# Patient Record
Sex: MALE | Race: WHITE | NOT HISPANIC OR LATINO | Employment: PART TIME | URBAN - NONMETROPOLITAN AREA
[De-identification: names, ages, dates, MRNs, and addresses within clinical notes are randomized per-mention and may not be internally consistent; named-entity substitution may affect disease eponyms.]

---

## 2017-03-24 ENCOUNTER — HOSPITAL ENCOUNTER (EMERGENCY)
Facility: HOSPITAL | Age: 22
Discharge: HOME | End: 2017-03-25
Attending: FAMILY MEDICINE | Admitting: FAMILY MEDICINE

## 2017-03-24 VITALS
DIASTOLIC BLOOD PRESSURE: 69 MMHG | SYSTOLIC BLOOD PRESSURE: 138 MMHG | WEIGHT: 155 LBS | OXYGEN SATURATION: 96 % | HEIGHT: 74 IN | RESPIRATION RATE: 16 BRPM | BODY MASS INDEX: 19.89 KG/M2 | HEART RATE: 118 BPM | TEMPERATURE: 98.5 F

## 2017-03-24 DIAGNOSIS — S61.412A HAND LACERATION, LEFT, INITIAL ENCOUNTER: Primary | ICD-10-CM

## 2017-03-24 PROCEDURE — 99282 EMERGENCY DEPT VISIT SF MDM: CPT

## 2017-03-24 RX ORDER — LIDOCAINE HYDROCHLORIDE 10 MG/ML
10 INJECTION, SOLUTION INFILTRATION; PERINEURAL ONCE
Status: COMPLETED | OUTPATIENT
Start: 2017-03-24 | End: 2017-03-25

## 2017-03-25 PROCEDURE — 90471 IMMUNIZATION ADMIN: CPT | Performed by: FAMILY MEDICINE

## 2017-03-25 PROCEDURE — 90715 TDAP VACCINE 7 YRS/> IM: CPT | Performed by: FAMILY MEDICINE

## 2017-03-25 PROCEDURE — 86317 IMMUNOASSAY INFECTIOUS AGENT: CPT

## 2017-03-25 PROCEDURE — 25010000002 TDAP 5-2.5-18.5 LF-MCG/0.5 SUSPENSION: Performed by: FAMILY MEDICINE

## 2017-03-25 RX ORDER — IBUPROFEN 200 MG
1 TABLET ORAL ONCE
Status: COMPLETED | OUTPATIENT
Start: 2017-03-25 | End: 2017-03-25

## 2017-03-25 RX ADMIN — LIDOCAINE HYDROCHLORIDE 3 ML: 10 INJECTION, SOLUTION INFILTRATION; PERINEURAL at 00:12

## 2017-03-25 RX ADMIN — TETANUS TOXOID, REDUCED DIPHTHERIA TOXOID AND ACELLULAR PERTUSSIS VACCINE, ADSORBED 0.5 ML: 5; 2.5; 8; 8; 2.5 SUSPENSION INTRAMUSCULAR at 00:00

## 2017-03-25 RX ADMIN — POLYMYXIN B SULFATE, BACITRACIN ZINC, NEOMYCIN SULFATE 1 APPLICATION: 5000; 3.5; 4 OINTMENT TOPICAL at 01:47

## 2017-03-25 NOTE — ED PROVIDER NOTES
Subjective   HPI Comments: 21 yr old WM presenting to ED secondary to using a knife to cut something and slipped and knife went all the way through the outer side of thumb. Controlled bleeding, full movement of fingers devi thumb. Slight dysthesia around cut. Unsure of last tetanus. Pain 7/10, worse with moving thumb.       History provided by:  Patient      Review of Systems   Musculoskeletal: Positive for myalgias.   Skin: Positive for wound.   All other systems reviewed and are negative.      Past Medical History:   Diagnosis Date   • Fracture of wrist        Allergies   Allergen Reactions   • Peanuts [Peanut Oil]        Past Surgical History:   Procedure Laterality Date   • HAND SURGERY Left 10/04/2016    CR, pin, splint ring metacarpal oblique shaft fracture.   • TONSILLECTOMY         Family History   Problem Relation Age of Onset   • Hypertension Other        Social History     Social History   • Marital status: Single     Spouse name: N/A   • Number of children: N/A   • Years of education: N/A     Social History Main Topics   • Smoking status: Never Smoker   • Smokeless tobacco: None   • Alcohol use No   • Drug use: No   • Sexual activity: Defer     Other Topics Concern   • None     Social History Narrative           Objective   Physical Exam   Constitutional: He is oriented to person, place, and time. He appears well-developed and well-nourished. No distress.   HENT:   Head: Normocephalic and atraumatic.   Eyes: EOM are normal. Pupils are equal, round, and reactive to light.   Neck: Neck supple.   Cardiovascular: Regular rhythm and normal heart sounds.    Pulmonary/Chest: Effort normal and breath sounds normal.   Musculoskeletal: Normal range of motion. He exhibits tenderness.        Hands:  Neurological: He is alert and oriented to person, place, and time.   Skin: Skin is warm and dry.   Psychiatric: He has a normal mood and affect. His behavior is normal.   Nursing note and vitals  reviewed.      Procedures         ED Course  ED Course      Procedure:  With first laceration 3 cm length, area covered with sterile dressing, hibiclens used to clean wound, using 1% Lidocaine to anesthetize area, wound fully explored without evidence of tendon laceration or muscle laceration. Closed with 5 4-0 Ethilon interrupted sutures.  With second Laceration 1.6 cm, same as above with site sterility, cleansing, exploring wound. Used 4 4-0 interrupted Ethilon sutures to close wound.  No complications; Tolerated well            MDM    Final diagnoses:   Hand laceration, left, initial encounter            David Dodson DO  03/25/17 0107

## 2018-01-14 ENCOUNTER — HOSPITAL ENCOUNTER (EMERGENCY)
Facility: HOSPITAL | Age: 23
Discharge: HOME | End: 2018-01-14
Attending: EMERGENCY MEDICINE | Admitting: EMERGENCY MEDICINE

## 2018-01-14 VITALS
DIASTOLIC BLOOD PRESSURE: 79 MMHG | HEIGHT: 74 IN | SYSTOLIC BLOOD PRESSURE: 150 MMHG | OXYGEN SATURATION: 99 % | WEIGHT: 162 LBS | TEMPERATURE: 98.6 F | RESPIRATION RATE: 18 BRPM | HEART RATE: 85 BPM | BODY MASS INDEX: 20.79 KG/M2

## 2018-01-14 DIAGNOSIS — I73.00 RAYNAUD'S PHENOMENON WITHOUT GANGRENE: Primary | ICD-10-CM

## 2018-01-14 PROCEDURE — 99282 EMERGENCY DEPT VISIT SF MDM: CPT

## 2018-01-14 NOTE — DISCHARGE INSTRUCTIONS
Raynaud Phenomenon  Raynaud phenomenon is a condition that affects the blood vessels (arteries) that carry blood to your fingers and toes. The arteries that supply blood to your ears or the tip of your nose might also be affected. Raynaud phenomenon causes the arteries to temporarily narrow. As a result, the flow of blood to the affected areas is temporarily decreased. This usually occurs in response to cold temperatures or stress. During an attack, the skin in the affected areas turns white. You may also feel tingling or numbness in those areas.  Attacks usually last for only a brief period, and then the blood flow to the area returns to normal. In most cases, Raynaud phenomenon does not cause serious health problems.  CAUSES   For many people with this condition, the cause is not known. Raynaud phenomenon is sometimes associated with other diseases, such as scleroderma or lupus.  RISK FACTORS  Raynaud phenomenon can affect anyone, but it develops most often in people who are 20-40 years old. It affects more females than males.  SIGNS AND SYMPTOMS  Symptoms of Raynaud phenomenon may occur when you are exposed to cold temperatures or when you have emotional stress. The symptoms may last for a few minutes or up to several hours. They usually affect your fingers but may also affect your toes, ears, or the tip of your nose. Symptoms may include:  · Changes in skin color. The skin in the affected areas will turn pale or white. The skin may then change from white to bluish to red as normal blood flow returns to the area.  · Numbness, tingling, or pain in the affected areas.  In severe cases, sores may develop in the affected areas.   DIAGNOSIS   Your health care provider will do a physical exam and take your medical history. You may be asked to put your hands in cold water to check for a reaction to cold temperature. Blood tests may be done to check for other diseases or conditions. Your health care provider may also  order a test to check the movement of blood through your arteries and veins (vascular ultrasound).  TREATMENT   Treatment often involves making lifestyle changes and taking steps to control your exposure to cold temperatures. For more severe cases, medicine (calcium channel blockers) may be used to improve blood flow. Surgery is sometimes done to block the nerves that control the affected arteries, but this is rare.  HOME CARE INSTRUCTIONS   · Avoid exposure to cold by taking these steps:  ¨ If possible, stay indoors during cold weather.  ¨ When you go outside during cold weather, dress in layers and wear mittens, a hat, a scarf, and warm footwear.  ¨ Wear mittens or gloves when handling ice or frozen food.  ¨ Use holders for glasses or cans containing cold drinks.  ¨ Let warm water run for a while before taking a shower or bath.  ¨ Warm up the car before driving in cold weather.  · If possible, avoid stressful and emotional situations. Exercise, meditation, and yoga may help you cope with stress. Biofeedback may be useful.  · Do not use any tobacco products, including cigarettes, chewing tobacco, or electronic cigarettes. If you need help quitting, ask your health care provider.  · Avoid secondhand smoke.  · Limit your use of caffeine. Switch to decaffeinated coffee, tea, and soda. Avoid chocolate.  · Wear loose fitting socks and comfortable, roomy shoes.  · Avoid vibrating tools and machinery.  · Take medicines only as directed by your health care provider.  SEEK MEDICAL CARE IF:   · Your discomfort becomes worse despite lifestyle changes.  · You develop sores on your fingers or toes that do not heal.  · Your fingers or toes turn black.  · You have breaks in the skin on your fingers or toes.  · You have a fever.  · You have pain or swelling in your joints.  · You have a rash.  · Your symptoms occur on only one side of your body.  This information is not intended to replace advice given to you by your health care  provider. Make sure you discuss any questions you have with your health care provider.  Document Released: 12/15/2001 Document Revised: 01/08/2016 Document Reviewed: 07/23/2015  Elsevier Interactive Patient Education © 2017 [employer]

## 2018-01-14 NOTE — ED PROVIDER NOTES
Subjective   History of Present Illness  22-year-old male otherwise healthy presenting with numbness to the left ring finger.  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  Initially, the finger was white and numb and since has turned purple.  He denies any trauma to the finger.  He does have an old boxers fracture in the area of this hand that that was over a year ago.  Denies any similar cyst symptoms in the past.  Does not smoke.  No history of IV drug abuse.    Review of Systems   Skin: Positive for color change and pallor.   All other systems reviewed and are negative.      Past Medical History:   Diagnosis Date   • Fracture of wrist        Allergies   Allergen Reactions   • Peanuts [Peanut Oil]        Past Surgical History:   Procedure Laterality Date   • HAND SURGERY Left 10/04/2016    CR, pin, splint ring metacarpal oblique shaft fracture.   • TONSILLECTOMY         Family History   Problem Relation Age of Onset   • Hypertension Other        Social History     Social History   • Marital status: Single     Spouse name: N/A   • Number of children: N/A   • Years of education: N/A     Social History Main Topics   • Smoking status: Never Smoker   • Smokeless tobacco: None   • Alcohol use No   • Drug use: No   • Sexual activity: Defer     Other Topics Concern   • None     Social History Narrative           Objective   Physical Exam   Constitutional: He is oriented to person, place, and time. He appears well-developed and well-nourished. No distress.   HENT:   Head: Normocephalic.   Mouth/Throat: Oropharynx is clear and moist.   Cardiovascular: Normal rate, regular rhythm, normal heart sounds and intact distal pulses.  Exam reveals no gallop and no friction rub.    No murmur heard.  Pulmonary/Chest: Effort normal and breath sounds normal. No respiratory distress. He has no wheezes. He has no rales.   Musculoskeletal: Normal range of motion. He exhibits no deformity.   Neurological: He is  alert and oriented to person, place, and time.   Able to make thumbs up, a-ok sign, cross fingers without difficulty. Able to flex and extend across all joints and sensation intact to all aspects of hand. 2+ radial pulses bilaterally   Skin: Skin is dry. No rash noted. He is not diaphoretic. No erythema. There is pallor.   L ring finger w/ purple nailbed with CRF about 3sec. Slightly numb to light touch.    Psychiatric: He has a normal mood and affect. His behavior is normal.   Nursing note and vitals reviewed.      Procedures         ED Course  ED Course                  MDM  22M here w/ numb, purple finger with slightly delayed capillary refill. AFVSS. Concern for distal thrombosis or embolus though unlikely given lack of risk factors vs Raynoud's phenomenon though odd to only be present in one finger.     4:32 PM Spoke to Dr. Baker who felt it unlikely that finger had distal embolus / thrombus and asked to warm hand and reassess. Now, hand warm, normal color, no numbness and patient feels back to baseline. Given this, will d/c home with strict return to care precautions.    Final diagnoses:   Raynaud's phenomenon without gangrene            Agustin Louise MD  01/14/18 7789

## 2018-04-29 ENCOUNTER — HOSPITAL ENCOUNTER (EMERGENCY)
Facility: HOSPITAL | Age: 23
Discharge: HOME | End: 2018-04-29
Attending: EMERGENCY MEDICINE | Admitting: EMERGENCY MEDICINE

## 2018-04-29 ENCOUNTER — APPOINTMENT (OUTPATIENT)
Dept: GENERAL RADIOLOGY | Facility: HOSPITAL | Age: 23
End: 2018-04-29

## 2018-04-29 VITALS
BODY MASS INDEX: 22.07 KG/M2 | HEART RATE: 82 BPM | OXYGEN SATURATION: 98 % | TEMPERATURE: 98.3 F | DIASTOLIC BLOOD PRESSURE: 80 MMHG | WEIGHT: 172 LBS | HEIGHT: 74 IN | SYSTOLIC BLOOD PRESSURE: 133 MMHG | RESPIRATION RATE: 16 BRPM

## 2018-04-29 DIAGNOSIS — S62.307A CLOSED FRACTURE OF FIFTH METACARPAL BONE OF LEFT HAND, UNSPECIFIED FRACTURE MORPHOLOGY, INITIAL ENCOUNTER: Primary | ICD-10-CM

## 2018-04-29 PROCEDURE — 99283 EMERGENCY DEPT VISIT LOW MDM: CPT

## 2018-04-29 PROCEDURE — 73130 X-RAY EXAM OF HAND: CPT

## 2018-04-29 RX ORDER — IBUPROFEN 800 MG/1
800 TABLET ORAL EVERY 8 HOURS PRN
Qty: 90 TABLET | Refills: 0 | Status: SHIPPED | OUTPATIENT
Start: 2018-04-29

## 2018-05-01 ENCOUNTER — OFFICE VISIT (OUTPATIENT)
Dept: ORTHOPEDIC SURGERY | Facility: CLINIC | Age: 23
End: 2018-05-01

## 2018-05-01 VITALS — BODY MASS INDEX: 22.07 KG/M2 | WEIGHT: 172 LBS | HEIGHT: 74 IN | RESPIRATION RATE: 18 BRPM

## 2018-05-01 DIAGNOSIS — S62.337A CLOSED DISPLACED FRACTURE OF NECK OF FIFTH METACARPAL BONE OF LEFT HAND, INITIAL ENCOUNTER: Primary | ICD-10-CM

## 2018-05-01 PROCEDURE — 99213 OFFICE O/P EST LOW 20 MIN: CPT | Performed by: ORTHOPAEDIC SURGERY

## 2018-05-01 NOTE — PROGRESS NOTES
Subjective   Patient ID: Jose Morse is a 22 y.o. male  Pain of the Left Hand (Patient states he punched a wall late Friday night early Saturday morning 04/27-28/18. He says it just feels like a broken hand, the pain is not awful but it's not fun.)             History of Present Illness    Right-hand dominant male injured his left hand when he hit a wall, has a history of the left ring  finger fracture playing football 2 years ago treated with pinning, denies any new swelling pain at the base of the ring metacarpal.  He was placed in splint x-rays done elsewhere showed minimal displaced boxer's fracture distal small metacarpal neck of the right hand    Review of Systems   Constitutional: Negative for fever.   HENT: Negative for voice change.    Eyes: Negative for visual disturbance.   Respiratory: Negative for shortness of breath.    Cardiovascular: Negative for chest pain.   Gastrointestinal: Negative for abdominal distention and abdominal pain.   Genitourinary: Negative for dysuria.   Musculoskeletal: Positive for arthralgias. Negative for gait problem and joint swelling.   Skin: Negative for rash.   Neurological: Negative for speech difficulty.   Hematological: Does not bruise/bleed easily.   Psychiatric/Behavioral: Negative for confusion.       Past Medical History:   Diagnosis Date   • Fracture of wrist         Past Surgical History:   Procedure Laterality Date   • HAND SURGERY Left 10/04/2016    CR, pin, splint ring metacarpal oblique shaft fracture.   • TONSILLECTOMY         Family History   Problem Relation Age of Onset   • Hypertension Other        Social History     Social History   • Marital status: Single     Spouse name: N/A   • Number of children: N/A   • Years of education: N/A     Occupational History   • Not on file.     Social History Main Topics   • Smoking status: Never Smoker   • Smokeless tobacco: Never Used   • Alcohol use No   • Drug use: No   • Sexual activity: Defer     Other  "Topics Concern   • Not on file     Social History Narrative   • No narrative on file       I have reviewed all of the above social hx, family hx, surgical hx, medications, allergies & ROS and confirm that it is accurate.    Allergies   Allergen Reactions   • Peanuts [Peanut Oil]          Current Outpatient Prescriptions:   •  cephalexin (KEFLEX) 500 MG capsule, Take 1 capsule by mouth 3 (Three) Times a Day., Disp: 30 capsule, Rfl: 0  •  cetirizine (zyrTEC) 10 MG tablet, Take 1 tablet by mouth Daily., Disp: 30 tablet, Rfl: 0  •  fluticasone (FLONASE) 50 MCG/ACT nasal spray, 1-2 sprays each nostril daily, Disp: 1 bottle, Rfl: 0  •  Hydrocodone-Acetaminophen (NORCO PO), Take  by mouth., Disp: , Rfl:   •  HYDROcodone-acetaminophen (NORCO) 5-325 MG per tablet, Take 1 tablet by mouth Every 6 (Six) Hours As Needed (PRN)., Disp: 30 tablet, Rfl: 0  •  ibuprofen (ADVIL,MOTRIN) 800 MG tablet, Take 1 tablet by mouth Every 8 (Eight) Hours As Needed for Mild Pain ., Disp: 90 tablet, Rfl: 0  •  predniSONE (DELTASONE) 20 MG tablet, Take 1 tablet by mouth Daily., Disp: 5 tablet, Rfl: 0  •  ranitidine (ZANTAC IN NACL) 50-0.45 MG/50ML-%, PRE OPERATIVELY, Disp: 50 mL, Rfl: 0    Objective   Resp 18   Ht 188 cm (74\")   Wt 78 kg (172 lb)   BMI 22.08 kg/m²    Physical Exam  Constitutional: Patient is oriented to person, place, and time. Patient appears well-developed and well-nourished.   HENT:Head: Normocephalic and atraumatic.   Eyes: EOM are normal. Pupils are equal, round, and reactive to light.   Neck: Normal range of motion. Neck supple.   Cardiovascular: Normal rate.    Pulmonary/Chest: Effort normal and breath sounds normal.   Abdominal: Soft.   Neurological: Patient is alert and oriented to person, place, and time.   Skin: Skin is warm and dry.   Psychiatric: Patient has a normal mood and affect.   Nursing note and vitals reviewed.       Ortho Exam   Right hand is neurovascularly intact no ecchymosis minimal swelling correct " rotational alignment slight cosmetic deformity of the small MP joint no tenderness over the shaft or base of the fourth metacarpal.    Assessment/Plan   Review of Radiographic Studies:    No new imaging done today.      Procedures     Jose was seen today for pain.    Diagnoses and all orders for this visit:    Closed displaced fracture of neck of fifth metacarpal bone of left hand, initial encounter       Use brace as instructed      Recommendations/Plan:   Exercise, medications, injections, other patient advice, and return appointment as noted.    Patient agreeable to call or return sooner for any concerns.             Impression:  New minimally displaced boxer's fracture left nondominant hand small finger, history of left hand ring finger metacarpal shaft pinning 2 years ago  Plan:  Continue with current splint while working, recheck in 1 month x-rays left hand at that time to confirm healing

## 2018-05-25 DIAGNOSIS — S62.337A CLOSED DISPLACED FRACTURE OF NECK OF FIFTH METACARPAL BONE OF LEFT HAND, INITIAL ENCOUNTER: Primary | ICD-10-CM

## 2018-12-08 ENCOUNTER — HOSPITAL ENCOUNTER (EMERGENCY)
Facility: HOSPITAL | Age: 23
Discharge: HOME | End: 2018-12-08
Attending: EMERGENCY MEDICINE | Admitting: EMERGENCY MEDICINE

## 2018-12-08 VITALS
RESPIRATION RATE: 15 BRPM | WEIGHT: 178.8 LBS | BODY MASS INDEX: 22.95 KG/M2 | HEART RATE: 90 BPM | DIASTOLIC BLOOD PRESSURE: 70 MMHG | SYSTOLIC BLOOD PRESSURE: 138 MMHG | TEMPERATURE: 98.1 F | OXYGEN SATURATION: 99 % | HEIGHT: 74 IN

## 2018-12-08 DIAGNOSIS — S61.210A LACERATION OF RIGHT INDEX FINGER WITHOUT FOREIGN BODY WITHOUT DAMAGE TO NAIL, INITIAL ENCOUNTER: Primary | ICD-10-CM

## 2018-12-08 DIAGNOSIS — S61.212A LACERATION OF RIGHT MIDDLE FINGER WITHOUT FOREIGN BODY WITHOUT DAMAGE TO NAIL, INITIAL ENCOUNTER: ICD-10-CM

## 2018-12-08 PROCEDURE — 99283 EMERGENCY DEPT VISIT LOW MDM: CPT

## 2018-12-08 RX ORDER — CEPHALEXIN 500 MG/1
500 CAPSULE ORAL 2 TIMES DAILY
Qty: 14 CAPSULE | Refills: 0 | Status: SHIPPED | OUTPATIENT
Start: 2018-12-08

## 2018-12-08 RX ORDER — CEPHALEXIN 250 MG/1
500 CAPSULE ORAL ONCE
Status: COMPLETED | OUTPATIENT
Start: 2018-12-08 | End: 2018-12-08

## 2018-12-08 RX ORDER — LIDOCAINE HYDROCHLORIDE 10 MG/ML
10 INJECTION, SOLUTION INFILTRATION; PERINEURAL ONCE
Status: COMPLETED | OUTPATIENT
Start: 2018-12-08 | End: 2018-12-08

## 2018-12-08 RX ADMIN — CEPHALEXIN 500 MG: 250 CAPSULE ORAL at 13:16

## 2018-12-08 RX ADMIN — LIDOCAINE HYDROCHLORIDE 10 ML: 10 INJECTION, SOLUTION INFILTRATION; PERINEURAL at 13:05

## 2018-12-08 NOTE — ED PROVIDER NOTES
Subjective   22-year-old male patient presents emergency room for evaluation of a laceration to his hand.  Patient states he got angry, and punched his hand through a mirror.  He has lacerations to the base of the third digits on the right hand.  He is well-controlled at this time.  Last tetanus within the past 2 years.  She denies any numbness or tingling extremity.            Review of Systems  A 10 point review of systems including constitutional, ENT, cardiovascular, respiratory, GI, , musculoskeletal, neuro, skin, psychiatric was performed and it was negative with exception rations the base of the second and third digit on the right hand.  Bleeding well controlled.  No numbness or tingling       Past Medical History:   Diagnosis Date   • Fracture of wrist        Allergies   Allergen Reactions   • Peanuts [Peanut Oil] Anaphylaxis       Past Surgical History:   Procedure Laterality Date   • HAND SURGERY Left 10/04/2016    CR, pin, splint ring metacarpal oblique shaft fracture.   • TONSILLECTOMY         Family History   Problem Relation Age of Onset   • Hypertension Other        Social History     Socioeconomic History   • Marital status: Single     Spouse name: Not on file   • Number of children: Not on file   • Years of education: Not on file   • Highest education level: Not on file   Tobacco Use   • Smoking status: Never Smoker   • Smokeless tobacco: Never Used   Substance and Sexual Activity   • Alcohol use: Yes     Comment: socially   • Drug use: No   • Sexual activity: Defer           Objective   Physical Exam   Constitutional: He appears well-developed and well-nourished.   Neck: Normal range of motion.   Cardiovascular: Normal rate, regular rhythm and normal heart sounds.   Pulmonary/Chest: Breath sounds normal. No respiratory distress.   Abdominal: Soft.   Musculoskeletal: Normal range of motion.        Right wrist: He exhibits normal range of motion, no tenderness, no bony tenderness, no swelling and  no laceration.        Arms:       Right hand: He exhibits tenderness and laceration. He exhibits normal range of motion, no bony tenderness, normal two-point discrimination, normal capillary refill, no deformity and no swelling. Decreased sensation is not present in the ulnar distribution and is not present in the medial distribution. He exhibits no finger abduction, no thumb/finger opposition and no wrist extension trouble.        Hands:  Skin: Skin is warm and dry.       Laceration Repair  Date/Time: 12/8/2018 12:56 PM  Performed by: Shwetha Chu APRN  Authorized by: Alicia Slater MD     Consent:     Consent obtained:  Verbal    Consent given by:  Patient    Risks discussed:  Infection, pain, need for additional repair and poor cosmetic result    Alternatives discussed:  No treatment and delayed treatment  Anesthesia (see MAR for exact dosages):     Anesthesia method:  Local infiltration    Local anesthetic:  Lidocaine 1% w/o epi  Laceration details:     Location:  Finger    Finger location:  R index finger    Length (cm):  1.5    Depth (mm):  0.5  Repair type:     Repair type:  Simple  Pre-procedure details:     Preparation:  Patient was prepped and draped in usual sterile fashion  Exploration:     Hemostasis achieved with:  Direct pressure    Wound exploration: wound explored through full range of motion and entire depth of wound probed and visualized      Wound extent: no foreign bodies/material noted, no muscle damage noted, no tendon damage noted and no underlying fracture noted      Contaminated: no    Treatment:     Area cleansed with:  Betadine and Hibiclens    Irrigation method:  Syringe    Visualized foreign bodies/material removed: yes    Skin repair:     Repair method:  Sutures    Suture size:  3-0    Suture material:  Nylon    Suture technique:  Simple interrupted    Number of sutures:  3  Approximation:     Approximation:  Close    Vermilion border: well-aligned    Post-procedure  details:     Dressing:  Antibiotic ointment, non-adherent dressing and adhesive bandage    Patient tolerance of procedure:  Tolerated well, no immediate complications  Laceration Repair  Date/Time: 12/8/2018 12:57 PM  Performed by: Shwetha Chu APRN  Authorized by: Alicia Slater MD     Consent:     Consent obtained:  Verbal    Consent given by:  Patient and parent    Risks discussed:  Infection, pain, need for additional repair and poor cosmetic result    Alternatives discussed:  No treatment and delayed treatment  Anesthesia (see MAR for exact dosages):     Anesthesia method:  Local infiltration    Local anesthetic:  Lidocaine 1% w/o epi  Laceration details:     Location:  Finger    Finger location:  R long finger    Length (cm):  1.5    Depth (mm):  0.5  Repair type:     Repair type:  Simple  Exploration:     Wound extent: no foreign bodies/material noted, no muscle damage noted, no nerve damage noted, no tendon damage noted and no vascular damage noted    Treatment:     Area cleansed with:  Betadine and Hibiclens    Amount of cleaning:  Standard    Irrigation solution:  Sterile saline    Irrigation method:  Syringe    Visualized foreign bodies/material removed: no    Skin repair:     Repair method:  Sutures    Suture size:  3-0    Suture material:  Nylon    Suture technique:  Simple interrupted    Number of sutures:  3  Approximation:     Approximation:  Close    Vermilion border: well-aligned    Post-procedure details:     Dressing:  Antibiotic ointment, non-adherent dressing and adhesive bandage    Patient tolerance of procedure:  Tolerated well, no immediate complications               ED Course      After copious cleansing and irrigation with normal saline, wound was closed without issue (see procedure note).  Patient tolerated procedure well-nourished complications.  Pre-and postprocedure.  Neurovascularly intact.  Discussed signs and symptoms of infection with the patient including increased  redness, fever, abnormal drainage or swelling.  He is to return to the emergency room immediately status occur.  Discussed suture care.  We'll initiate antibiotic therapy and there is been a delay in closure with this wound.  Patient verbalized understanding with all instructions and was in agreement.            MDM      Final diagnoses:   None            Shwetha Chu, MICHELL  12/08/18 1225       Shwetha Chu, MICHELL  12/08/18 1306

## 2018-12-08 NOTE — DISCHARGE INSTRUCTIONS
Take home medications as prescribed.  Tylenol/Motrin for minor pain or fever management.   It is important to complete course of antibiotics, even if you are feeling better.  Sutures will need to be removed in 10 days.  You may return to the ER or have you PCP remove them.  Return to the emergency room for uncontrolled pain, redness, abnormal drainage, fever, nausea, vomiting,  chest pain, shortness or palpitations.    Thank you for allowing us to participate in your care today; we know that you have a choice in healthcare and  appreciate your confidence in [facility].    You have been supplied with 1 new prescription(s) today.